# Patient Record
Sex: FEMALE | Race: BLACK OR AFRICAN AMERICAN | NOT HISPANIC OR LATINO | URBAN - METROPOLITAN AREA
[De-identification: names, ages, dates, MRNs, and addresses within clinical notes are randomized per-mention and may not be internally consistent; named-entity substitution may affect disease eponyms.]

---

## 2019-11-26 LAB
ABO + RH BLD: NORMAL
C TRACH RRNA SPEC QL PROBE: NEGATIVE
HBV SURFACE AG SERPL QL IA: NEGATIVE
HIV 1+2 AB+HIV1 P24 AG SERPL QL IA: NEGATIVE
INDIRECT COOMBS: NEGATIVE
N GONORRHOEAE, AMPLIFIED DNA: NEGATIVE
RPR: NON REACTIVE
RUBELLA IMMUNE STATUS: NORMAL

## 2020-04-03 LAB
GLUCOSE, 1 HOUR: 195
GLUCOSE, 3 HOUR: 168
GLUCOSE, FASTING: 76
HCT VFR BLD AUTO: 31 % (ref 36–46)
HGB BLD-MCNC: 9.9 G/DL (ref 12–16)
INDIRECT COOMBS: NEGATIVE

## 2020-05-29 LAB — GLUCOSE, 2 HOUR: 178

## 2020-06-22 ENCOUNTER — HOSPITAL ENCOUNTER (OUTPATIENT)
Facility: HOSPITAL | Age: 40
Discharge: HOME | End: 2020-06-22
Attending: OBSTETRICS & GYNECOLOGY | Admitting: OBSTETRICS & GYNECOLOGY
Payer: COMMERCIAL

## 2020-06-22 VITALS — SYSTOLIC BLOOD PRESSURE: 126 MMHG | HEART RATE: 90 BPM | DIASTOLIC BLOOD PRESSURE: 79 MMHG

## 2020-06-22 PROCEDURE — 59025 FETAL NON-STRESS TEST: CPT

## 2020-06-25 ENCOUNTER — HOSPITAL ENCOUNTER (OUTPATIENT)
Facility: HOSPITAL | Age: 40
Discharge: HOME | End: 2020-06-25
Attending: OBSTETRICS & GYNECOLOGY | Admitting: OBSTETRICS & GYNECOLOGY
Payer: COMMERCIAL

## 2020-06-25 VITALS — SYSTOLIC BLOOD PRESSURE: 123 MMHG | HEART RATE: 78 BPM | DIASTOLIC BLOOD PRESSURE: 75 MMHG

## 2020-06-25 DIAGNOSIS — O24.419 GESTATIONAL DIABETES MELLITUS (GDM): ICD-10-CM

## 2020-06-25 PROCEDURE — 59025 FETAL NON-STRESS TEST: CPT

## 2020-06-29 ENCOUNTER — HOSPITAL ENCOUNTER (OUTPATIENT)
Facility: HOSPITAL | Age: 40
Discharge: HOME | End: 2020-06-29
Attending: OBSTETRICS & GYNECOLOGY | Admitting: OBSTETRICS & GYNECOLOGY
Payer: COMMERCIAL

## 2020-06-29 VITALS — DIASTOLIC BLOOD PRESSURE: 75 MMHG | SYSTOLIC BLOOD PRESSURE: 128 MMHG | HEART RATE: 74 BPM

## 2020-06-29 DIAGNOSIS — O24.419 GDM (GESTATIONAL DIABETES MELLITUS): ICD-10-CM

## 2020-06-29 PROCEDURE — 59025 FETAL NON-STRESS TEST: CPT

## 2020-06-29 NOTE — DISCHARGE INSTRUCTIONS
Keep your scheduled appointment with your provider.    Call your Doctor if you have any of the following:  Temperature above 100 degrees  Nausea, vomiting and/or diarrhea  Severe headache, dizziness, or blurred vision  Notable increase in swelling of hands or feet  Notable swelling of face and lips  Difficulty, pain or burning with urination  Foul smelling vaginal discharge  Decreased fetal movement    Come to the hospital if you have any of the following symptoms:  Your water breaks  Contractions every 3-5 minutes for 2 or more hours  Vaginal bleeding like a period    After 28 weeks, you should feel 10 distinct fetal movements within a 2 hour period.    It is recommended that you drink 1/2 a gallon of water each day.  Tea, Soda and Juice are  in addition to this.

## 2020-06-30 LAB
PRENATAL STREP B CULTURE: POSITIVE

## 2020-07-02 ENCOUNTER — HOSPITAL ENCOUNTER (OUTPATIENT)
Facility: HOSPITAL | Age: 40
LOS: 1 days | Discharge: HOME | End: 2020-07-02
Attending: OBSTETRICS & GYNECOLOGY | Admitting: OBSTETRICS & GYNECOLOGY
Payer: COMMERCIAL

## 2020-07-02 VITALS — HEART RATE: 74 BPM | DIASTOLIC BLOOD PRESSURE: 76 MMHG | SYSTOLIC BLOOD PRESSURE: 129 MMHG

## 2020-07-02 DIAGNOSIS — O24.419 GDM (GESTATIONAL DIABETES MELLITUS): ICD-10-CM

## 2020-07-02 PROCEDURE — 59025 FETAL NON-STRESS TEST: CPT

## 2020-07-06 ENCOUNTER — HOSPITAL ENCOUNTER (OUTPATIENT)
Facility: HOSPITAL | Age: 40
Discharge: HOME | End: 2020-07-06
Attending: OBSTETRICS & GYNECOLOGY | Admitting: OBSTETRICS & GYNECOLOGY
Payer: COMMERCIAL

## 2020-07-06 VITALS — SYSTOLIC BLOOD PRESSURE: 130 MMHG | HEART RATE: 78 BPM | DIASTOLIC BLOOD PRESSURE: 89 MMHG

## 2020-07-06 DIAGNOSIS — O24.419 GESTATIONAL DIABETES MELLITUS (GDM): ICD-10-CM

## 2020-07-06 PROCEDURE — 59025 FETAL NON-STRESS TEST: CPT

## 2020-07-09 ENCOUNTER — HOSPITAL ENCOUNTER (OUTPATIENT)
Facility: HOSPITAL | Age: 40
Discharge: HOME | End: 2020-07-09
Attending: OBSTETRICS & GYNECOLOGY | Admitting: OBSTETRICS & GYNECOLOGY
Payer: COMMERCIAL

## 2020-07-09 DIAGNOSIS — O24.419 GESTATIONAL DIABETES: ICD-10-CM

## 2020-07-09 DIAGNOSIS — Z01.818 OTHER SPECIFIED PRE-OPERATIVE EXAMINATION: Primary | ICD-10-CM

## 2020-07-09 PROCEDURE — 59025 FETAL NON-STRESS TEST: CPT

## 2020-07-13 ENCOUNTER — ANESTHESIA (OUTPATIENT)
Dept: OBSTETRICS AND GYNECOLOGY | Facility: HOSPITAL | Age: 40
End: 2020-07-13
Payer: COMMERCIAL

## 2020-07-13 ENCOUNTER — ANESTHESIA EVENT (OUTPATIENT)
Dept: OBSTETRICS AND GYNECOLOGY | Facility: HOSPITAL | Age: 40
End: 2020-07-13
Payer: COMMERCIAL

## 2020-07-13 ENCOUNTER — HOSPITAL ENCOUNTER (INPATIENT)
Facility: HOSPITAL | Age: 40
LOS: 2 days | Discharge: HOME | End: 2020-07-15
Attending: OBSTETRICS & GYNECOLOGY | Admitting: OBSTETRICS & GYNECOLOGY
Payer: COMMERCIAL

## 2020-07-13 DIAGNOSIS — Z98.891 PREVIOUS CESAREAN SECTION: Primary | ICD-10-CM

## 2020-07-13 LAB
ABO + RH BLD: NORMAL
AMPHET+METHAMPHET UR QL: NEGATIVE
ANION GAP SERPL CALC-SCNC: 12 MMOL/L (ref 8–16)
BARBITURATES UR QL SCN>200 NG/ML: NEGATIVE
BASOPHILS # BLD AUTO: 0.03 K/UL (ref 0–0.2)
BASOPHILS NFR BLD: 0.3 % (ref 0–1.9)
BENZODIAZ UR QL SCN>200 NG/ML: NEGATIVE
BLD GP AB SCN CELLS X3 SERPL QL: NORMAL
BLOOD GROUP ANTIBODIES SERPL: NORMAL
BUN SERPL-MCNC: 11 MG/DL (ref 6–20)
BZE UR QL SCN: NEGATIVE
CALCIUM SERPL-MCNC: 8.1 MG/DL (ref 8.7–10.5)
CANNABINOIDS UR QL SCN: NEGATIVE
CHLORIDE SERPL-SCNC: 105 MMOL/L (ref 95–110)
CO2 SERPL-SCNC: 19 MMOL/L (ref 23–29)
CREAT SERPL-MCNC: 0.6 MG/DL (ref 0.5–1.4)
CREAT UR-MCNC: 64 MG/DL (ref 15–325)
DIFFERENTIAL METHOD: ABNORMAL
EOSINOPHIL # BLD AUTO: 0.1 K/UL (ref 0–0.5)
EOSINOPHIL NFR BLD: 0.6 % (ref 0–8)
ERYTHROCYTE [DISTWIDTH] IN BLOOD BY AUTOMATED COUNT: 13.2 % (ref 11.5–14.5)
EST. GFR  (AFRICAN AMERICAN): >60 ML/MIN/1.73 M^2
EST. GFR  (NON AFRICAN AMERICAN): >60 ML/MIN/1.73 M^2
GLUCOSE SERPL-MCNC: 77 MG/DL (ref 70–110)
HCT VFR BLD AUTO: 30 % (ref 37–48.5)
HGB BLD-MCNC: 9.3 G/DL (ref 12–16)
IMM GRANULOCYTES # BLD AUTO: 0.05 K/UL (ref 0–0.04)
IMM GRANULOCYTES NFR BLD AUTO: 0.6 % (ref 0–0.5)
LYMPHOCYTES # BLD AUTO: 1.9 K/UL (ref 1–4.8)
LYMPHOCYTES NFR BLD: 22.1 % (ref 18–48)
MCH RBC QN AUTO: 24.8 PG (ref 27–31)
MCHC RBC AUTO-ENTMCNC: 31 G/DL (ref 32–36)
MCV RBC AUTO: 80 FL (ref 82–98)
MONOCYTES # BLD AUTO: 0.7 K/UL (ref 0.3–1)
MONOCYTES NFR BLD: 7.8 % (ref 4–15)
NEUTROPHILS # BLD AUTO: 5.9 K/UL (ref 1.8–7.7)
NEUTROPHILS NFR BLD: 68.6 % (ref 38–73)
NRBC BLD-RTO: 0 /100 WBC
OPIATES UR QL SCN: NEGATIVE
PCP UR QL SCN>25 NG/ML: NEGATIVE
PCP UR QL SCN>25 NG/ML: NEGATIVE
PLATELET # BLD AUTO: 240 K/UL (ref 150–350)
PMV BLD AUTO: 10.1 FL (ref 9.2–12.9)
POTASSIUM SERPL-SCNC: 3.5 MMOL/L (ref 3.5–5.1)
RBC # BLD AUTO: 3.75 M/UL (ref 4–5.4)
RPR SER QL: NORMAL
SARS-COV-2 RDRP RESP QL NAA+PROBE: NEGATIVE
SODIUM SERPL-SCNC: 136 MMOL/L (ref 136–145)
TOXICOLOGY INFORMATION: NORMAL
WBC # BLD AUTO: 8.63 K/UL (ref 3.9–12.7)

## 2020-07-13 PROCEDURE — 63600175 PHARM REV CODE 636 W HCPCS: Performed by: NURSE ANESTHETIST, CERTIFIED REGISTERED

## 2020-07-13 PROCEDURE — 63600175 PHARM REV CODE 636 W HCPCS: Performed by: OBSTETRICS & GYNECOLOGY

## 2020-07-13 PROCEDURE — C9290 INJ, BUPIVACAINE LIPOSOME: HCPCS | Performed by: OBSTETRICS & GYNECOLOGY

## 2020-07-13 PROCEDURE — 25000003 PHARM REV CODE 250: Performed by: ANESTHESIOLOGY

## 2020-07-13 PROCEDURE — 27000670 HC SET COMBINED SPINAL AND EPIDURAL: Performed by: ANESTHESIOLOGY

## 2020-07-13 PROCEDURE — S0020 INJECTION, BUPIVICAINE HYDRO: HCPCS | Performed by: OBSTETRICS & GYNECOLOGY

## 2020-07-13 PROCEDURE — 80048 BASIC METABOLIC PNL TOTAL CA: CPT

## 2020-07-13 PROCEDURE — 62326 NJX INTERLAMINAR LMBR/SAC: CPT | Performed by: ANESTHESIOLOGY

## 2020-07-13 PROCEDURE — 25000003 PHARM REV CODE 250: Performed by: OBSTETRICS & GYNECOLOGY

## 2020-07-13 PROCEDURE — 71000033 HC RECOVERY, INTIAL HOUR: Performed by: OBSTETRICS & GYNECOLOGY

## 2020-07-13 PROCEDURE — 80307 DRUG TEST PRSMV CHEM ANLYZR: CPT

## 2020-07-13 PROCEDURE — C1751 CATH, INF, PER/CENT/MIDLINE: HCPCS | Performed by: ANESTHESIOLOGY

## 2020-07-13 PROCEDURE — 85025 COMPLETE CBC W/AUTO DIFF WBC: CPT

## 2020-07-13 PROCEDURE — 37000009 HC ANESTHESIA EA ADD 15 MINS: Performed by: OBSTETRICS & GYNECOLOGY

## 2020-07-13 PROCEDURE — 86870 RBC ANTIBODY IDENTIFICATION: CPT

## 2020-07-13 PROCEDURE — 37000008 HC ANESTHESIA 1ST 15 MINUTES: Performed by: OBSTETRICS & GYNECOLOGY

## 2020-07-13 PROCEDURE — U0002 COVID-19 LAB TEST NON-CDC: HCPCS

## 2020-07-13 PROCEDURE — 86592 SYPHILIS TEST NON-TREP QUAL: CPT

## 2020-07-13 PROCEDURE — 27000284 HC CANNULA NASAL: Performed by: ANESTHESIOLOGY

## 2020-07-13 PROCEDURE — 86850 RBC ANTIBODY SCREEN: CPT

## 2020-07-13 PROCEDURE — 36415 COLL VENOUS BLD VENIPUNCTURE: CPT

## 2020-07-13 PROCEDURE — 12000002 HC ACUTE/MED SURGE SEMI-PRIVATE ROOM

## 2020-07-13 PROCEDURE — 36000680 HC C/S TUBAL LIGATION LEVEL I

## 2020-07-13 PROCEDURE — 71000039 HC RECOVERY, EACH ADD'L HOUR: Performed by: OBSTETRICS & GYNECOLOGY

## 2020-07-13 RX ORDER — PRAMOXINE HYDROCHLORIDE 10 MG/G
AEROSOL, FOAM TOPICAL 4 TIMES DAILY PRN
Status: DISCONTINUED | OUTPATIENT
Start: 2020-07-13 | End: 2020-07-15 | Stop reason: HOSPADM

## 2020-07-13 RX ORDER — ONDANSETRON 2 MG/ML
4 INJECTION INTRAMUSCULAR; INTRAVENOUS EVERY 6 HOURS PRN
Status: ACTIVE | OUTPATIENT
Start: 2020-07-13 | End: 2020-07-15

## 2020-07-13 RX ORDER — SODIUM CHLORIDE, SODIUM LACTATE, POTASSIUM CHLORIDE, CALCIUM CHLORIDE 600; 310; 30; 20 MG/100ML; MG/100ML; MG/100ML; MG/100ML
INJECTION, SOLUTION INTRAVENOUS CONTINUOUS PRN
Status: DISCONTINUED | OUTPATIENT
Start: 2020-07-13 | End: 2020-07-13

## 2020-07-13 RX ORDER — PHENYLEPHRINE HYDROCHLORIDE 10 MG/ML
INJECTION INTRAVENOUS
Status: DISCONTINUED | OUTPATIENT
Start: 2020-07-13 | End: 2020-07-13

## 2020-07-13 RX ORDER — DOCUSATE SODIUM 100 MG/1
200 CAPSULE, LIQUID FILLED ORAL 2 TIMES DAILY
Status: DISCONTINUED | OUTPATIENT
Start: 2020-07-13 | End: 2020-07-15 | Stop reason: HOSPADM

## 2020-07-13 RX ORDER — DIPHENHYDRAMINE HCL 25 MG
25 CAPSULE ORAL EVERY 4 HOURS PRN
Status: DISCONTINUED | OUTPATIENT
Start: 2020-07-13 | End: 2020-07-15 | Stop reason: HOSPADM

## 2020-07-13 RX ORDER — ADHESIVE BANDAGE
30 BANDAGE TOPICAL 2 TIMES DAILY PRN
Status: DISCONTINUED | OUTPATIENT
Start: 2020-07-14 | End: 2020-07-15 | Stop reason: HOSPADM

## 2020-07-13 RX ORDER — HYDROCORTISONE 25 MG/G
CREAM TOPICAL 3 TIMES DAILY PRN
Status: DISCONTINUED | OUTPATIENT
Start: 2020-07-13 | End: 2020-07-15 | Stop reason: HOSPADM

## 2020-07-13 RX ORDER — FENTANYL CITRATE 50 UG/ML
INJECTION, SOLUTION INTRAMUSCULAR; INTRAVENOUS
Status: DISCONTINUED | OUTPATIENT
Start: 2020-07-13 | End: 2020-07-13

## 2020-07-13 RX ORDER — HYDROMORPHONE HYDROCHLORIDE 1 MG/ML
0.25 INJECTION, SOLUTION INTRAMUSCULAR; INTRAVENOUS; SUBCUTANEOUS
Status: DISCONTINUED | OUTPATIENT
Start: 2020-07-13 | End: 2020-07-14

## 2020-07-13 RX ORDER — BUPIVACAINE HYDROCHLORIDE 5 MG/ML
24 INJECTION, SOLUTION EPIDURAL; INTRACAUDAL
Status: DISCONTINUED | OUTPATIENT
Start: 2020-07-13 | End: 2020-07-15 | Stop reason: HOSPADM

## 2020-07-13 RX ORDER — PRENATAL WITH FERROUS FUM AND FOLIC ACID 3080; 920; 120; 400; 22; 1.84; 3; 20; 10; 1; 12; 200; 27; 25; 2 [IU]/1; [IU]/1; MG/1; [IU]/1; MG/1; MG/1; MG/1; MG/1; MG/1; MG/1; UG/1; MG/1; MG/1; MG/1; MG/1
1 TABLET ORAL DAILY
Status: DISCONTINUED | OUTPATIENT
Start: 2020-07-13 | End: 2020-07-15 | Stop reason: HOSPADM

## 2020-07-13 RX ORDER — CALCIUM CARBONATE 500(1250)
1 TABLET,CHEWABLE ORAL DAILY
COMMUNITY

## 2020-07-13 RX ORDER — DIPHENHYDRAMINE HYDROCHLORIDE 50 MG/ML
12.5 INJECTION INTRAMUSCULAR; INTRAVENOUS EVERY 4 HOURS PRN
Status: DISCONTINUED | OUTPATIENT
Start: 2020-07-13 | End: 2020-07-15 | Stop reason: HOSPADM

## 2020-07-13 RX ORDER — BISACODYL 10 MG
10 SUPPOSITORY, RECTAL RECTAL ONCE AS NEEDED
Status: DISCONTINUED | OUTPATIENT
Start: 2020-07-13 | End: 2020-07-15 | Stop reason: HOSPADM

## 2020-07-13 RX ORDER — OXYCODONE HYDROCHLORIDE 5 MG/1
10 TABLET ORAL EVERY 4 HOURS PRN
Status: DISCONTINUED | OUTPATIENT
Start: 2020-07-13 | End: 2020-07-15 | Stop reason: HOSPADM

## 2020-07-13 RX ORDER — ONDANSETRON 2 MG/ML
4 INJECTION INTRAMUSCULAR; INTRAVENOUS EVERY 6 HOURS PRN
Status: DISCONTINUED | OUTPATIENT
Start: 2020-07-13 | End: 2020-07-15 | Stop reason: HOSPADM

## 2020-07-13 RX ORDER — ONDANSETRON 2 MG/ML
INJECTION INTRAMUSCULAR; INTRAVENOUS
Status: DISCONTINUED | OUTPATIENT
Start: 2020-07-13 | End: 2020-07-13

## 2020-07-13 RX ORDER — OXYTOCIN-SODIUM CHLORIDE 0.9% IV SOLN 30 UNIT/500ML 30-0.9/5 UT/ML-%
SOLUTION INTRAVENOUS CONTINUOUS PRN
Status: DISCONTINUED | OUTPATIENT
Start: 2020-07-13 | End: 2020-07-13

## 2020-07-13 RX ORDER — DIPHENHYDRAMINE HYDROCHLORIDE 50 MG/ML
25 INJECTION INTRAMUSCULAR; INTRAVENOUS EVERY 4 HOURS PRN
Status: DISCONTINUED | OUTPATIENT
Start: 2020-07-13 | End: 2020-07-15 | Stop reason: HOSPADM

## 2020-07-13 RX ORDER — SIMETHICONE 80 MG
1 TABLET,CHEWABLE ORAL EVERY 6 HOURS PRN
Status: DISCONTINUED | OUTPATIENT
Start: 2020-07-13 | End: 2020-07-15 | Stop reason: HOSPADM

## 2020-07-13 RX ORDER — SODIUM CHLORIDE, SODIUM LACTATE, POTASSIUM CHLORIDE, CALCIUM CHLORIDE 600; 310; 30; 20 MG/100ML; MG/100ML; MG/100ML; MG/100ML
INJECTION, SOLUTION INTRAVENOUS CONTINUOUS
Status: DISCONTINUED | OUTPATIENT
Start: 2020-07-13 | End: 2020-07-15 | Stop reason: HOSPADM

## 2020-07-13 RX ORDER — NALOXONE HCL 0.4 MG/ML
0.4 VIAL (ML) INJECTION ONCE
Status: DISCONTINUED | OUTPATIENT
Start: 2020-07-13 | End: 2020-07-15 | Stop reason: HOSPADM

## 2020-07-13 RX ORDER — NALBUPHINE HYDROCHLORIDE 20 MG/ML
5 INJECTION, SOLUTION INTRAMUSCULAR; INTRAVENOUS; SUBCUTANEOUS
Status: DISCONTINUED | OUTPATIENT
Start: 2020-07-13 | End: 2020-07-15 | Stop reason: HOSPADM

## 2020-07-13 RX ORDER — HYDROMORPHONE HCL IN 0.9% NACL 6 MG/30 ML
PATIENT CONTROLLED ANALGESIA SYRINGE INTRAVENOUS CONTINUOUS
Status: DISCONTINUED | OUTPATIENT
Start: 2020-07-13 | End: 2020-07-13

## 2020-07-13 RX ORDER — CEFAZOLIN SODIUM 2 G/50ML
2 SOLUTION INTRAVENOUS
Status: COMPLETED | OUTPATIENT
Start: 2020-07-13 | End: 2020-07-13

## 2020-07-13 RX ADMIN — SODIUM CHLORIDE, SODIUM LACTATE, POTASSIUM CHLORIDE, AND CALCIUM CHLORIDE: .6; .31; .03; .02 INJECTION, SOLUTION INTRAVENOUS at 01:07

## 2020-07-13 RX ADMIN — Medication: at 11:07

## 2020-07-13 RX ADMIN — PHENYLEPHRINE HYDROCHLORIDE 100 MCG: 10 INJECTION INTRAVENOUS at 07:07

## 2020-07-13 RX ADMIN — Medication: at 08:07

## 2020-07-13 RX ADMIN — CEFAZOLIN SODIUM 2 G: 2 SOLUTION INTRAVENOUS at 07:07

## 2020-07-13 RX ADMIN — SODIUM CHLORIDE, SODIUM LACTATE, POTASSIUM CHLORIDE, AND CALCIUM CHLORIDE: .6; .31; .03; .02 INJECTION, SOLUTION INTRAVENOUS at 07:07

## 2020-07-13 RX ADMIN — FENTANYL CITRATE 10 MCG: 50 INJECTION INTRAMUSCULAR; INTRAVENOUS at 07:07

## 2020-07-13 RX ADMIN — SODIUM CHLORIDE, SODIUM LACTATE, POTASSIUM CHLORIDE, AND CALCIUM CHLORIDE: .6; .31; .03; .02 INJECTION, SOLUTION INTRAVENOUS at 10:07

## 2020-07-13 RX ADMIN — SODIUM CHLORIDE, SODIUM LACTATE, POTASSIUM CHLORIDE, AND CALCIUM CHLORIDE 1000 ML: .6; .31; .03; .02 INJECTION, SOLUTION INTRAVENOUS at 07:07

## 2020-07-13 RX ADMIN — Medication 2250 ML/HR: at 07:07

## 2020-07-13 RX ADMIN — ONDANSETRON 4 MG: 2 INJECTION INTRAMUSCULAR; INTRAVENOUS at 07:07

## 2020-07-13 RX ADMIN — FENTANYL CITRATE 90 MCG: 50 INJECTION INTRAMUSCULAR; INTRAVENOUS at 07:07

## 2020-07-13 NOTE — ANESTHESIA PREPROCEDURE EVALUATION
2020  Flaca Dejesus is a 40 y.o., female.    Patient Active Problem List   Diagnosis    Gestational diabetes mellitus (GDM)    GDM (gestational diabetes mellitus)    Gestational diabetes    Previous  section       Past Surgical History:   Procedure Laterality Date     SECTION      TONSILLECTOMY AND ADENOIDECTOMY          Tobacco Use:  The patient  reports that she has never smoked. She has never used smokeless tobacco.     No results found for this or any previous visit.          Lab Results   Component Value Date    WBC 8.63 2020    HGB 9.3 (L) 2020    HCT 30.0 (L) 2020    MCV 80 (L) 2020     2020     BMP  Lab Results   Component Value Date     2020    K 3.5 2020     2020    CO2 19 (L) 2020    BUN 11 2020    CREATININE 0.6 2020    CALCIUM 8.1 (L) 2020    ANIONGAP 12 2020    GLU 77 2020       No results found for this or any previous visit.        Anesthesia Evaluation          Review of Systems  Anesthesia Hx:  No problems with previous Anesthesia   Hepatic/GI:   GERD (TUMS prn)    Endocrine:  Diabetes, Gestational Diabetes        Physical Exam  General:  Well nourished    Airway/Jaw/Neck:  Airway Findings: Mouth Opening: Normal Tongue: Normal  General Airway Assessment: Adult  Mallampati: II  TM Distance: Normal, at least 6 cm  Jaw/Neck Findings:  Neck ROM: Normal ROM     Eyes/Ears/Nose:  Eyes/Ears/Nose Findings:    Dental:  Dental Findings: In tact   Chest/Lungs:  Chest/Lungs Findings: Clear to auscultation, Normal Respiratory Rate     Heart/Vascular:  Heart Findings: Rate: Normal  Rhythm: Regular Rhythm  Sounds: Normal     Abdomen:  Abdomen Findings:     Musculoskeletal:  Musculoskeletal Findings:    Skin:  Skin Findings:     Mental Status:  Mental Status Findings:   Cooperative, Alert and Oriented         Anesthesia Plan  Type of Anesthesia, risks & benefits discussed:  Anesthesia Type:  CSE  Patient's Preference: CSE  Intra-op Monitoring Plan: standard ASA monitors  Intra-op Monitoring Plan Comments:   Post Op Pain Control Plan: multimodal analgesia  Post Op Pain Control Plan Comments:   Induction:    Beta Blocker:         Informed Consent: Patient understands risks and agrees with Anesthesia plan.  Questions answered. Anesthesia consent signed with patient.  ASA Score: 2     Day of Surgery Review of History & Physical:    H&P update referred to the surgeon.     Anesthesia Plan Notes: CSE.  Ofirmev 1000mg.  Dilaudid PCA for post op pain.  No Duramorph.          Ready For Surgery From Anesthesia Perspective.

## 2020-07-13 NOTE — L&D DELIVERY NOTE
[facility]   Section   Operative Note    SUMMARY     Date of Procedure: 2020     Procedure: Procedure(s) (LRB):   SECTION, WITH TUBAL LIGATION (N/A)    Surgeon(s) and Role:     * Sita Pierce MD - Primary    Assisting Surgeon: None    Pre-Operative Diagnosis:  IUP at 39 wga, previous c/section x 2, undesired fertility, GDM, AMA    Post-Operative Diagnosis: Post-Op Diagnosis Codes: same     * Sterilization [Z30.2]     * Maternal care due to low transverse uterine scar from previous  delivery [O34.211]    Anesthesia: spinal    Technical Procedures Used: low transverse c/section,  Tubal ligation via parkland method           Description of the Findings of the Procedure: VMI, apgars 9/9, vertex, 7#4, normal uterus, fallopian tubes, and ovaries      Complications: No    Blood Loss: 500 mL     Procedure in detail - The risks benefits indications and alternatives of procedure were discussed with the patient and informed consent was obtained. The patient signed her tubal papers in the office and she understands that a tubal ligation is for permanent birth control, but there is a risk of failure of approximately 5 in one thousand with increased risk of ectopic if pregnancy does occur. She was taken to the operating room where spinal anesthesia was found to be adequate. She was prepped and draped in normal sterile fashion in the supine position with a Akbar catheter in place. A Pfannenstiel skin incision was made with a scalpel and carried through to the underlying layer fascia with the Bovie. The fascia was incised in the midline and this incision was extended laterally using the curved Portillo scissors. The superior aspect of the fascial incision was grasped with Мария clamps and underlying rectus muscles were dissected off sharply using the curved Portillo scissors and bluntly. The inferior aspect of the fascial incision was treated in a similar manner. The rectus muscles were   in the midline and the peritoneum was identified and entered bluntly. The peritoneal incision was extended superiorly and inferiorly with good visualization the bladder. The bladder blade was inserted and the vesicouterine peritoneum was incised in the midline. This incision was extended laterally and the bladder flap was created digitally. The lower uterine segment was incised in the midline and this incision was extended laterally digitally. The membranes were ruptured upon entry. The bladder blade was removed and the infant's head shoulders and body delivered without difficulty. The cord was clamped and cut and the infant was handed off to waiting nursery staff. The placenta was delivered and the uterus was exteriorized and cleared of all clots and debris. The uterine incision was closed with 0 PDS in a running locked fashion in one layer and excellent hemostasis was seen. Each fallopian tube was then grasped with a Denver clamp and a 2-0 silk tie was placed at the proximal portion of each tube. A 3 cm segment in the middle of each tube was suture ligated using 2-0 plain gut and excised using the Metzenbaum scissors. Excellent hemostasis was seen. The uterus was returned the pelvis and the pelvis was irrigated copiously with warm normal saline. There was a small vein bleeding on the very top of the vesicouterine peritoneum close to the bladder and a 3-O chromic figure of 8 suture was placed.  The uterine incision, lower uterine segment, and fallopian tubes were all checked for hemostasis and all instruments and sponges were removed from the abdomen and pelvis. The peritoneum was closed using 2-0 Vicryl in a running fashion. The rectus muscles were checked for hemostasis. The fascia was closed using 0 PDS in a running fashion. The subcuticular fat was irrigated with normal saline and hemostasis was achieved using the Bovie. A dilute solution of exparel with marcaine was injected sub q per unit protocol.   Deidre's fascia was reapproximated using 2-0 Vicryl in a running fashion. The skin was closed using 4-0 Vicryl in a subcuticular fashion and a Mepilex dressing was placed over the incision. The patient tolerated the procedure well. Sponge lap needle and instrument counts were correct ×2. She was taken recovery in stable condition.       Specimens:   Specimen (12h ago, onward)     Start     Ordered    20  Specimen to Pathology - Surgery  Once     Comments: Pre-op Diagnosis: IUP@39.1, undesired fertility, repeat c/s with BTLProcedure(s): SECTION, WITH TUBAL LIGATION Number of specimens: 2Name of specimens: left and right fallopian tubes      2039                Condition: Good    Disposition: L&D recovery    Attestation: Good         Delivery Information for Román Dejesus    Birth information:  YOB: 2018   Time of birth: 7:45 AM   Sex: male   Head Delivery Date/Time: 2020  7:45 AM   Delivery type: , Low Transverse   Gestational Age: 39w1d    Delivery Providers    Delivering clinician: Sita Pierce MD           Measurements    Weight: 3303 g  Length: 50.2 cm         Apgars    Living status: Living  Apgars:  1 min.:  5 min.:  10 min.:  15 min.:  20 min.:    Skin color:  1  1       Heart rate:  2  2       Reflex irritability:  2  2       Muscle tone:  2  2       Respiratory effort:  2  2       Total:  9  9       Apgars assigned by: SANTA BERGMAN RN         Operative Delivery    Forceps attempted?: No  Vacuum extractor attempted?: No         Shoulder Dystocia    Shoulder dystocia present?: No           Presentation    Presentation: Vertex  Position: Left Occiput Anterior           Interventions/Resuscitation    Method: Tactile Stimulation, Bulb Suctioning       Cord    Vessels: 3 vessels  Complications: Nuchal  Nuchal Intervention: reduced  Nuchal Cord Description: loose nuchal cord  Number of Loops: 1  Delayed Cord Clamping?: Yes  Cord Clamped Date/Time:  2020  7:46 AM  Cord Blood Disposition: Sent with Baby  Gases Sent?: No  Stem Cell Collection (by MD): No       Placenta    Placenta delivery date/time: 2020 0746  Placenta removal: Manual removal  Placenta appearance: Intact  Placenta disposition: discarded           Labor Events:       labor: No     Labor Onset Date/Time:         Dilation Complete Date/Time:         Start Pushing Date/Time:         Start Pushing Date/Time:       Rupture Date/Time:            Rupture type:          Fluid Amount:       Fluid Color:       Fluid Odor:       Membrane Status:                steroids: None     Antibiotics given for GBS: No     Induction:       Indications for induction:        Augmentation:       Indications for augmentation:       Labor complications:       Additional complications:          Cervical ripening:                     Delivery:      Episiotomy:       Indication for Episiotomy:       Perineal Lacerations:   Repaired:      Periurethral Laceration:   Repaired:     Labial Laceration:   Repaired:     Sulcus Laceration:   Repaired:     Vaginal Laceration:   Repaired:     Cervical Laceration:   Repaired:     Repair suture:       Repair # of packets:       Last Value - EBL - Nursing (mL):       Sum - EBL - Nursing (mL): 500     Last Value - EBL - Anesthesia (mL): 500      Calculated QBL (mL):       Vaginal Sweep Performed:       Surgicount Correct:         Other providers:       Anesthesia    Method: Spinal, Epidural          Details (if applicable):  Trial of Labor No    Categorization: Repeat    Priority: Routine   Indications for : Repeat Section   Incision Type: low transverse     Additional  information:  Forceps:    Vacuum:    Breech:    Observed anomalies    Other (Comments):

## 2020-07-13 NOTE — ANESTHESIA POSTPROCEDURE EVALUATION
Anesthesia Post Evaluation    Patient: Flaca Dejesus    Procedure(s) Performed: Procedure(s) (LRB):   SECTION, WITH TUBAL LIGATION (N/A)    Final Anesthesia Type: CSE    Patient location during evaluation: labor & delivery  Patient participation: Yes- Able to Participate  Level of consciousness: awake and alert  Post-procedure vital signs: reviewed and stable  Pain management: adequate  Airway patency: patent    PONV status at discharge: No PONV  Anesthetic complications: no      Cardiovascular status: blood pressure returned to baseline and stable  Respiratory status: unassisted and room air  Hydration status: euvolemic  Follow-up needed           Vitals Value Taken Time   /88 20 0906   Temp 36.6 °C (97.9 °F) 20 0516   Pulse 61 20 0916   Resp 16 20 0901   SpO2 100 % 20 0916   Vitals shown include unvalidated device data.      No case tracking events are documented in the log.      Pain/Oly Score: No data recorded

## 2020-07-13 NOTE — NURSING
Pt transferred to 2103 via bed with all belongings. Pt denies any pain. Bleeding light, fundus firm. NADN. Bedside handoff complete with Zita Renner RN

## 2020-07-13 NOTE — TRANSFER OF CARE
"Anesthesia Transfer of Care Note    Patient: Flaca Dejesus    Procedure(s) Performed: Procedure(s) (LRB):   SECTION, WITH TUBAL LIGATION (N/A)    Patient location: Labor and Delivery    Anesthesia Type: CSE    Transport from OR: Transported from OR on room air with adequate spontaneous ventilation    Post pain: adequate analgesia    Post assessment: no apparent anesthetic complications    Post vital signs: stable    Level of consciousness: awake, alert and oriented    Nausea/Vomiting: no nausea/vomiting    Complications: none    Transfer of care protocol was followed      Last vitals:   Visit Vitals  /80   Pulse 76   Temp 36.6 °C (97.9 °F) (Oral)   Resp 18   Ht 5' 4" (1.626 m)   Wt 68.9 kg (152 lb)   Breastfeeding No   BMI 26.09 kg/m²     "

## 2020-07-13 NOTE — ANESTHESIA PROCEDURE NOTES
CSE    Patient location during procedure: OB  Start time: 7/13/2020 7:18 AM  Timeout: 7/13/2020 7:17 AM  End time: 7/13/2020 7:22 AM    Staffing  Authorizing Provider: Sher Obrien MD  Performing Provider: Sher Obrien MD    Preanesthetic Checklist  Completed: patient identified, surgical consent, pre-op evaluation, timeout performed, IV checked, risks and benefits discussed and monitors and equipment checked  CSE  Patient position: sitting  Prep: Betadine  Patient monitoring: heart rate, cardiac monitor, continuous pulse ox and frequent blood pressure checks  Approach: midline  Spinal Needle  Needle type: pencil-tip   Needle gauge: 25 G  Needle length: 5 in  Epidural Needle  Injection technique: MELI air  Needle type: Tuohy   Needle gauge: 17 G  Needle length: 3.5 in  Location: L3-4  Needle localization: anatomical landmarks  Catheter  Catheter type: springwTotalTakeout  Catheter size: 19 G  Test dose: lidocaine 1.5% with Epi 1-to-200,000  Additional Documentation: incremental injection, negative aspiration for CSF and negative aspiration for heme  Assessment  Sensory level: T5   Dermatomal levels determined by pinch or prick  Intrathecal Medications:  administered: primary anesthetic mcg of    Additional Notes  After CSF was obtained, a mixture of bupivacaine 0.75% hyperbaric x 1.4 mls with fentanyl 10 mcg was injected.

## 2020-07-14 ENCOUNTER — ANESTHESIA EVENT (OUTPATIENT)
Dept: OBSTETRICS AND GYNECOLOGY | Facility: HOSPITAL | Age: 40
End: 2020-07-14

## 2020-07-14 ENCOUNTER — ANESTHESIA (OUTPATIENT)
Dept: OBSTETRICS AND GYNECOLOGY | Facility: HOSPITAL | Age: 40
End: 2020-07-14

## 2020-07-14 LAB
ABO + RH BLD: NORMAL
BLD GP AB SCN CELLS X3 SERPL QL: NORMAL
ERYTHROCYTE [DISTWIDTH] IN BLOOD BY AUTOMATED COUNT: 13.2 % (ref 11.5–14.5)
FETAL CELL SCN BLD QL ROSETTE: NORMAL
HCT VFR BLD AUTO: 25 % (ref 37–48.5)
HGB BLD-MCNC: 7.6 G/DL (ref 12–16)
INJECT RH IG VOL PATIENT: NORMAL ML
MCH RBC QN AUTO: 24.6 PG (ref 27–31)
MCHC RBC AUTO-ENTMCNC: 30.4 G/DL (ref 32–36)
MCV RBC AUTO: 81 FL (ref 82–98)
PLATELET # BLD AUTO: 219 K/UL (ref 150–350)
PMV BLD AUTO: 10.2 FL (ref 9.2–12.9)
RBC # BLD AUTO: 3.09 M/UL (ref 4–5.4)
WBC # BLD AUTO: 10.89 K/UL (ref 3.9–12.7)

## 2020-07-14 PROCEDURE — 63600519 RHOGAM PHARM REV CODE 636 ALT 250 W HCPCS: Performed by: OBSTETRICS & GYNECOLOGY

## 2020-07-14 PROCEDURE — 86850 RBC ANTIBODY SCREEN: CPT

## 2020-07-14 PROCEDURE — 12000002 HC ACUTE/MED SURGE SEMI-PRIVATE ROOM

## 2020-07-14 PROCEDURE — 25000003 PHARM REV CODE 250: Performed by: OBSTETRICS & GYNECOLOGY

## 2020-07-14 PROCEDURE — 85461 HEMOGLOBIN FETAL: CPT

## 2020-07-14 PROCEDURE — 85027 COMPLETE CBC AUTOMATED: CPT

## 2020-07-14 RX ORDER — HYDROMORPHONE HYDROCHLORIDE 1 MG/ML
1.5 INJECTION, SOLUTION INTRAMUSCULAR; INTRAVENOUS; SUBCUTANEOUS EVERY 4 HOURS PRN
Status: DISCONTINUED | OUTPATIENT
Start: 2020-07-14 | End: 2020-07-15 | Stop reason: HOSPADM

## 2020-07-14 RX ADMIN — DOCUSATE SODIUM 200 MG: 100 CAPSULE, LIQUID FILLED ORAL at 09:07

## 2020-07-14 RX ADMIN — SIMETHICONE 80 MG: 80 TABLET, CHEWABLE ORAL at 08:07

## 2020-07-14 RX ADMIN — OXYCODONE HYDROCHLORIDE 10 MG: 5 TABLET ORAL at 04:07

## 2020-07-14 RX ADMIN — OXYCODONE HYDROCHLORIDE 10 MG: 5 TABLET ORAL at 06:07

## 2020-07-14 RX ADMIN — DOCUSATE SODIUM 200 MG: 100 CAPSULE, LIQUID FILLED ORAL at 08:07

## 2020-07-14 RX ADMIN — OXYCODONE HYDROCHLORIDE 5 MG: 5 TABLET ORAL at 02:07

## 2020-07-14 RX ADMIN — IBUPROFEN 600 MG: 400 TABLET, FILM COATED ORAL at 06:07

## 2020-07-14 RX ADMIN — OXYCODONE HYDROCHLORIDE 5 MG: 5 TABLET ORAL at 08:07

## 2020-07-14 RX ADMIN — HUMAN RHO(D) IMMUNE GLOBULIN 300 MCG: 1500 SOLUTION INTRAMUSCULAR; INTRAVENOUS at 04:07

## 2020-07-14 RX ADMIN — IBUPROFEN 600 MG: 400 TABLET, FILM COATED ORAL at 11:07

## 2020-07-14 RX ADMIN — PRENATAL VIT W/ FE FUMARATE-FA TAB 27-0.8 MG 1 TABLET: 27-0.8 TAB at 08:07

## 2020-07-14 NOTE — ANESTHESIA POST-OP PAIN MANAGEMENT
Acute Pain Service Progress Note    Flaca Dejesus is a 40 y.o., female, [id number].      Problem List:    Active Hospital Problems    Diagnosis  POA    *Previous  section [Z98.891]  Not Applicable      Resolved Hospital Problems   No resolved problems to display.         Vitals   Vitals:    20 0459   BP: 121/77   Pulse: 72   Resp: 18   Temp: 36.7 °C (98.1 °F)     Postop day 1.  Status post  under regional anesthesia.  Patient placed on PCA Dilaudid for postoperative pain control.  This morning patient is resting comfortably in bed she is alert and oriented without complaints.  She denies headache, back pain, leg pain weakness or numbness.  Epidural site examined in no evidence of bleeding bruising or discharge noted.    Patient reports overall good pain control since surgery with appropriate use of PCA.  PCA use has decreased overnight and this morning patient has had minimal use.  Will discontinue PCA this morning, we will continue oral pain medications as ordered and will add IV Dilaudid as needed for breakthrough pain.    No apparent anesthetic related complications.    Continue current pain management.    Please re-consult if needed.           Evaluator Frank Durán

## 2020-07-14 NOTE — PROGRESS NOTES
[facility]  Obstetrics  Postpartum Progress Note    Patient Name: Flaca Dejesus  MRN: [id number]  Admission Date: 2020  Hospital Length of Stay: 1 days  Attending Physician: Sita Pierce MD  Primary Care Provider: Primary Doctor No    Subjective:     Principal Problem:Previous  section     Hospital Course:  No notes on file    Interval History: s/p repeat c/section and BTL POD#1    She is doing well this morning. She is tolerating a regular diet without nausea or vomiting. She is voiding spontaneously. She is ambulating. She has not passed flatus, and has not a BM. Vaginal bleeding is mild. She denies fever or chills. Abdominal pain is mild and controlled with oral medications. She is breastfeeding. She desires circumcision for her male baby: yes.    Objective:     Vital Signs (Most Recent):  Temp: 98.2 °F (36.8 °C) (20)  Pulse: (!) 58 (20)  Resp: 18 (20)  BP: 132/83 (20)  SpO2: 97 % (20) Vital Signs (24h Range):  Temp:  [97.4 °F (36.3 °C)-98.5 °F (36.9 °C)] 98.2 °F (36.8 °C)  Pulse:  [56-72] 58  Resp:  [16-18] 18  SpO2:  [97 %-98 %] 97 %  BP: (114-146)/(74-89) 132/83     Weight: 68.9 kg (152 lb)  Body mass index is 26.09 kg/m².      Intake/Output Summary (Last 24 hours) at 2020 1325  Last data filed at 2020 1148  Gross per 24 hour   Intake 1433.4 ml   Output 4950 ml   Net -3516.6 ml           Significant Labs:  Lab Results   Component Value Date    GROUPTRH B NEG 2020    HEPBSAG Negative 2019    STREPBCULT negative 2020    STREPBCULT positive 2020    STREPBCULT positive 2020     Recent Labs   Lab 20  0341   HGB 7.6*   HCT 25.0*       I have personallly reviewed all pertinent lab results from the last 24 hours.    Physical Exam:   Constitutional: She is oriented to person, place, and time. She appears well-developed and well-nourished.    HENT:   Head: Normocephalic and atraumatic.        Pulmonary/Chest: Effort normal.        Abdominal: Soft. Bowel sounds are normal. She exhibits no distension. Abdominal incision: mepilex dressing in place, dry. There is no abdominal tenderness (appropriate post op tenderness).     Genitourinary:    Uterus normal.      Genitourinary Comments: Uterus if firm below the umbilicus             Musculoskeletal: Normal range of motion and moves all extremeties. No tenderness or edema.       Neurological: She is alert and oriented to person, place, and time.    Skin: Skin is warm and dry.    Psychiatric: She has a normal mood and affect. Her behavior is normal.       Assessment/Plan:     40 y.o. female  for:  S/p c/section, asymptomatic anemia    * Previous  section  S/p repeat c/section and BTL  POD#1, doing well    Continue post op care  Encouraged ambulations        .    Sita Pierce MD  Obstetrics  [facility]

## 2020-07-14 NOTE — PLAN OF CARE
JUNIOR met with patient and spouse to complete OB initial discharge planning assessment. Patient lives with spouse Matt Dejesus, 2 other children, and now baby. Patient has access to a car seat and plans to breast feed. Patient has access to diapers and understands process of how to apply for WIC. Patient stated they have no needs from /case management staff at this time. Pt did not have emergency contact or insurance on file so JUNIOR added pt's  at her request and copied insurance cards/sent to insurance verification dept.     07/14/20 1820   Discharge Assessment   Assessment Type Discharge Planning Assessment   Confirmed/corrected address and phone number on facesheet? Yes   Assessment information obtained from? Patient   Prior to hospitilization cognitive status: Alert/Oriented   Prior to hospitalization functional status: Independent   Current cognitive status: Alert/Oriented   Current Functional Status: Independent   Lives With spouse;child(kurt), dependent   Able to Return to Prior Arrangements yes   Is patient able to care for self after discharge? Yes   Who are your caregiver(s) and their phone number(s)? Spouse Matt Dejesus added as emergency contact ([phone])   Does the patient have transportation home? Yes   Transportation Anticipated family or friend will provide   Discharge Plan A Home   Discharge Plan B Home   DME Needed Upon Discharge  none   Patient/Family in Agreement with Plan yes

## 2020-07-14 NOTE — PLAN OF CARE
Patient has remained stable this shift. All vitals within limits. See flow sheet for assessments. Bleeding is controlled. Fundus is firm and midline, pain is managed by medications per mar. ID bands are on and verified. Patient has been educated this shift. All questions have been answered and denies needs at this time. \

## 2020-07-14 NOTE — SUBJECTIVE & OBJECTIVE
Interval History: s/p repeat c/section and BTL POD#1    She is doing well this morning. She is tolerating a regular diet without nausea or vomiting. She is voiding spontaneously. She is ambulating. She has not passed flatus, and has not a BM. Vaginal bleeding is mild. She denies fever or chills. Abdominal pain is mild and controlled with oral medications. She is breastfeeding. She desires circumcision for her male baby: yes.    Objective:     Vital Signs (Most Recent):  Temp: 98.2 °F (36.8 °C) (07/14/20 0814)  Pulse: (!) 58 (07/14/20 0814)  Resp: 18 (07/14/20 0843)  BP: 132/83 (07/14/20 0814)  SpO2: 97 % (07/14/20 0814) Vital Signs (24h Range):  Temp:  [97.4 °F (36.3 °C)-98.5 °F (36.9 °C)] 98.2 °F (36.8 °C)  Pulse:  [56-72] 58  Resp:  [16-18] 18  SpO2:  [97 %-98 %] 97 %  BP: (114-146)/(74-89) 132/83     Weight: 68.9 kg (152 lb)  Body mass index is 26.09 kg/m².      Intake/Output Summary (Last 24 hours) at 7/14/2020 1325  Last data filed at 7/14/2020 1148  Gross per 24 hour   Intake 1433.4 ml   Output 4950 ml   Net -3516.6 ml           Significant Labs:  Lab Results   Component Value Date    GROUPTRH B NEG 07/14/2020    HEPBSAG Negative 11/26/2019    STREPBCULT negative 06/30/2020    STREPBCULT positive 06/30/2020    STREPBCULT positive 06/30/2020     Recent Labs   Lab 07/14/20  0341   HGB 7.6*   HCT 25.0*       I have personallly reviewed all pertinent lab results from the last 24 hours.    Physical Exam:   Constitutional: She is oriented to person, place, and time. She appears well-developed and well-nourished.    HENT:   Head: Normocephalic and atraumatic.       Pulmonary/Chest: Effort normal.        Abdominal: Soft. Bowel sounds are normal. She exhibits no distension. Abdominal incision: mepilex dressing in place, dry. There is no abdominal tenderness (appropriate post op tenderness).     Genitourinary:    Uterus normal.      Genitourinary Comments: Uterus if firm below the umbilicus             Musculoskeletal:  Normal range of motion and moves all extremeties. No tenderness or edema.       Neurological: She is alert and oriented to person, place, and time.    Skin: Skin is warm and dry.    Psychiatric: She has a normal mood and affect. Her behavior is normal.

## 2020-07-15 VITALS
WEIGHT: 152 LBS | HEART RATE: 70 BPM | SYSTOLIC BLOOD PRESSURE: 124 MMHG | RESPIRATION RATE: 18 BRPM | BODY MASS INDEX: 25.95 KG/M2 | OXYGEN SATURATION: 98 % | DIASTOLIC BLOOD PRESSURE: 70 MMHG | HEIGHT: 64 IN | TEMPERATURE: 98 F

## 2020-07-15 PROCEDURE — 25000003 PHARM REV CODE 250: Performed by: OBSTETRICS & GYNECOLOGY

## 2020-07-15 PROCEDURE — 90715 TDAP VACCINE 7 YRS/> IM: CPT | Performed by: OBSTETRICS & GYNECOLOGY

## 2020-07-15 PROCEDURE — 90471 IMMUNIZATION ADMIN: CPT | Performed by: OBSTETRICS & GYNECOLOGY

## 2020-07-15 PROCEDURE — 63600175 PHARM REV CODE 636 W HCPCS: Performed by: OBSTETRICS & GYNECOLOGY

## 2020-07-15 RX ORDER — IBUPROFEN 600 MG/1
600 TABLET ORAL EVERY 6 HOURS
Qty: 30 TABLET | Refills: 0
Start: 2020-07-15

## 2020-07-15 RX ORDER — OXYCODONE HYDROCHLORIDE 10 MG/1
5 TABLET ORAL EVERY 4 HOURS PRN
Qty: 28 TABLET | Refills: 0 | Status: SHIPPED | OUTPATIENT
Start: 2020-07-15

## 2020-07-15 RX ADMIN — PRENATAL VIT W/ FE FUMARATE-FA TAB 27-0.8 MG 1 TABLET: 27-0.8 TAB at 08:07

## 2020-07-15 RX ADMIN — IBUPROFEN 600 MG: 400 TABLET, FILM COATED ORAL at 05:07

## 2020-07-15 RX ADMIN — OXYCODONE HYDROCHLORIDE 10 MG: 5 TABLET ORAL at 12:07

## 2020-07-15 RX ADMIN — IBUPROFEN 600 MG: 400 TABLET, FILM COATED ORAL at 12:07

## 2020-07-15 RX ADMIN — DOCUSATE SODIUM 200 MG: 100 CAPSULE, LIQUID FILLED ORAL at 08:07

## 2020-07-15 RX ADMIN — CLOSTRIDIUM TETANI TOXOID ANTIGEN (FORMALDEHYDE INACTIVATED), CORYNEBACTERIUM DIPHTHERIAE TOXOID ANTIGEN (FORMALDEHYDE INACTIVATED), BORDETELLA PERTUSSIS TOXOID ANTIGEN (GLUTARALDEHYDE INACTIVATED), BORDETELLA PERTUSSIS FILAMENTOUS HEMAGGLUTININ ANTIGEN (FORMALDEHYDE INACTIVATED), BORDETELLA PERTUSSIS PERTACTIN ANTIGEN, AND BORDETELLA PERTUSSIS FIMBRIAE 2/3 ANTIGEN 0.5 ML: 5; 2; 2.5; 5; 3; 5 INJECTION, SUSPENSION INTRAMUSCULAR at 12:07

## 2020-07-15 RX ADMIN — OXYCODONE HYDROCHLORIDE 10 MG: 5 TABLET ORAL at 08:07

## 2020-07-15 NOTE — LACTATION NOTE
"This note was copied from a baby's chart.  Infant in mother's arms. Stated, "he is really breastfeeding well." Multiple feedings documented up to 30 minutes in length at each breast. Denies any pain/discomfort. Discussed normal  feeding patterns, pumping, engorgement, and followup resources. V/u. Ensured postpartum/breastfeeding guide at bedside. Will f/u prn.   "

## 2020-07-15 NOTE — DISCHARGE INSTRUCTIONS
FOLLOW UP WITH YOUR DOCTOR IN 1 WEEK OR SOONER FOR ANY PROBLEMS.    Pelvic rest for 6 weeks (no sex, tampons, douching, nothing in the vagina)   You can experience vaginal bleeding on and off for up to 6 weeks, it will gradually get lighter and the color will change from bright red to a brownish discharge towards the end.     Activity:   NO strenuous activities or exercising for 6 weeks. Do not /lift anything over 15 pounds and no heavy housework or cleaning for 6 weeks. Limit stair climbing to twice a day during the first 2 weeks.   NO driving for 4 weeks. You may take short car trips but do not drive.   You may shower ONLY for the first 2 weeks, after 2 weeks you can soak in a bathtub. Use a mild soap, no heavy perfumes or fragrances to avoid irritation.   Walking frequently following a  delivery promotes healing and decreases pain associated with gas.   If constipation develops: You may take Colace (stool softener), Milk of Magnesia, Dulcolax or Miralax. All of these medications are sold over the counter.   Incision Care:   Clean your incision with mild soap & warm water only- do not scrub- let warm water run over it, then pat dry.     Pain Relief:   You may take Motrin for mild pain & uterine cramping.     Emotional Changes:   You may experience baby blues after delivery. You may feel let down, anxious and cry easily. This is normal. These feelings can begin 2-3 days after delivery and usually disappear in about a week or two. Prolonged sadness may indicate postpartum depression.       ***SEEK IMMEDIATE HELP FROM THE EMERGENCY ROOM IF YOU HAVE ANY CHEST PAIN OR DIFFICULTY BREATHING.    Call your doctor for any of the following:   Problems with any of your medications, inability to eat.   Foul smelling vaginal discharge.   If you notice pus-like drainage from your incision, if your incision or the area around it becomes hot or swollen, or if you notice a foul smelling odor.   Temperature above  100.4.   Heavy vaginal bleeding. All women bleed different after delivery and each delivery is different. Heavy bleeding consists of saturating a ernesto pad in a 1 hour time period. Passing clots are normal, if you pass a blood clot larger than the size of a golf ball call your doctor's office.   If you experience pain in your legs/calves, if one leg increases in size and becomes swollen or becomes hot to touch or discolored.   Crying or periods of sadness beyond 2 weeks.     If you are breast feeding:   Wash your breasts with mild soap and warm water.   You should wear a supportive bra.   You should continue to take a prenatal vitamin for 6 weeks or until breastfeeding is discontinued.   If nipples are sore, apply a few drops of breast milk after nursing and let air dry or you can use Lanolin cream.   If breasts are engorged, apply warmth and express milk.   [facility] lactation consultant is available at [phone] for your breastfeeding needs.    If you are not breastfeeding:   Wear a tight bra and do not stimulate your breasts. Avoid handling your breasts and do not express milk. You may apply ice packs or cabbage leaves to relieve discomfort from engorgement. If your breasts become warm to touch, reddened or lumps develop call your doctor.          Breastfeeding Discharge Instructions       [facility] Breastfeeding Support Services [phone]   AAP recommendation of exclusive breastfeeding for the first 6 months of life and continued breastfeeding with the introduction of supplemental foods beyond the first year of life.  Instructed on the recommendation to delay all bottle and pacifier use until after 4 weeks of age and breastfeeding is well established.  Discussed the benefits of exclusive breastfeeding for both mother and baby.  Discussed the risks of supplementation/pacifier use on the exclusivity of breastfeeding in the first 6 months. Feed the baby at the earliest sign of hunger or  comfort  o Hands to mouth, sucking motions  o Rooting or searching for something to suck on  o Dont wait for crying - it is a not a late sign of hunger; it is a sign of distress     The feedings may be 8-12 times per 24hrs and will not follow a schedule   Alternate the breast you start the feeding with, or start with the breast that feels the fullest   Switch breasts when the baby takes himself off the breast or falls asleep   Keep offering breasts until the baby looks full, no longer gives hunger signs, and stays asleep when placed on his back in the crib   If the baby is sleepy and wont wake for a feeding, put the baby skin-to-skin dressed in a diaper against the mothers bare chest   Sleep near your baby   The baby should be positioned and latched on to the breast correctly  o Chest-to-chest, chin in the breast  o Babys lips are flipped outward  o Babys mouth is stretched open wide like a shout  o Babys sucking should feel like tugging to the mother  - The baby should be drinking at the breast:  o You should hear swallowing or gulping throughout the feeding  o You should see milk on the babys lips when he comes off the breast  o Your breasts should be softer when the baby is finished feeding  o The baby should look relaxed at the end of feedings  o After the 4th day and your milk is in:  o The babys poop should turn bright yellow and be loose, watery, and seedy  o The baby should have at least 3-4 poops the size of the palm of your hand per day  o The baby should have at least 6-8 wet diapers per day  o The urine should be light yellow in color  You should drink when you are thirsty and eat a healthy diet when you are    hungry.     Take naps to get the rest you need.   Take medications and/or drink alcohol only with permission of your obstetrician    or the babys pediatrician.  You can also call the [facility],   ([phone]), Monday-Friday, 8am-5pm Central time, to get the most    up-to-date evidence-based information on the use of medications during   pregnancy and breastfeeding.      The baby should be examined by a pediatrician at 3-5 days of age; unless ordered sooner by the pediatrician.  Once your milk comes in, the baby should be back to birth weight no later than 10-14 days of age.    If youre having problems with breastfeeding or have any questions regarding breastfeeding- call [facility] Breastfeeding Support services [phone]

## 2020-07-15 NOTE — PLAN OF CARE
07/15/20 1410   Final Note   Assessment Type Final Discharge Note   Anticipated Discharge Disposition Home

## 2020-07-15 NOTE — DISCHARGE SUMMARY
[facility]  Obstetrics  Discharge Summary      Patient Name: Flaca Dejesus  MRN: [id number]  Admission Date: 2020  Hospital Length of Stay: 2 days  Discharge Date and Time:  07/15/2020 1:19 PM  Attending Physician: Sita Pierce MD   Discharging Provider: Shania Beckford MD   Primary Care Provider: Primary Doctor No    HPI: No notes on file        Procedure(s) (LRB):   SECTION, WITH TUBAL LIGATION (N/A)     Hospital Course:   No notes on file     Consults (From admission, onward)        Status Ordering Provider     Consult to Lactation  Use PRN     Provider:  (Not yet assigned)    Acknowledged SITA PIERCE          Final Active Diagnoses:    Diagnosis Date Noted POA    PRINCIPAL PROBLEM:  Previous  section [Z98.891] 2020 Not Applicable      Problems Resolved During this Admission:        Significant Diagnostic Studies: Labs:   CBC   Recent Labs   Lab 20  0341   WBC 10.89   HGB 7.6*   HCT 25.0*        Lab Results   Component Value Date    GROUPTRH B NEG 2020     Physical exam:  Patient doing well passing flatus requesting discharge home  Vital signs stable afebrile  Fundus firm nontender palpation  Dressing dry  No Homans sign    Assessment and plan:  Postoperative day 2 Caesarean section  Rh negative baby is Rh positive patient received RhoGAM  Percocet for pain      Feeding Method: breast    Immunizations     Date Immunization Status Dose Route/Site Given by    07/15/20 1248 Rho (D) Immune Globulin - IM Deferred 300 mcg Intramuscular/ April NAYELI Soto    07/15/20 1247 MMR Deferred 0.5 mL Subcutaneous/Left deltoid April NAYELI Soto    07/15/20 1237 Tdap Deleted 0.5 mL Intramuscular/Left deltoid     20 1601 Rho (D) Immune Globulin - IM Given 300 mcg Intramuscular/Right Ventrogluteal Tejal White RN    07/15/20 1235 Tdap Given 0.5 mL Intramuscular/Left deltoid April NAYELI Soto          Delivery:    Episiotomy:     Lacerations:     Repair suture:      Repair # of packets:     Blood loss (ml):       Birth information:  YOB: 2018   Time of birth: 7:45 AM   Sex: male   Delivery type: , Low Transverse   Gestational Age: 39w1d    Delivery Clinician:      Other providers:       Additional  information:  Forceps:    Vacuum:    Breech:    Observed anomalies      Living?:           APGARS  One minute Five minutes Ten minutes   Skin color:         Heart rate:         Grimace:         Muscle tone:         Breathing:         Totals: 9  9        Placenta: Delivered:       appearance    Pending Diagnostic Studies:     None          Discharged Condition: good    Disposition: Home or Self Care    Follow Up:  Follow-up Information     Sita Pierce MD In 1 week.    Specialty: Obstetrics  Why: For wound re-check  Contact information:  [address]  [address]  [facility]  [facility] [address]  [phone]                 Patient Instructions:      Diet Adult Regular     No driving until:   Order Comments: 14 days for vaginal delivery  28 days for  Section     Notify your health care provider if you experience any of the following:  temperature >100.4     Notify your health care provider if you experience any of the following:  persistent nausea and vomiting or diarrhea     Notify your health care provider if you experience any of the following:  severe uncontrolled pain     Notify your health care provider if you experience any of the following:  redness, tenderness, or signs of infection (pain, swelling, redness, odor or green/yellow discharge around incision site)     Notify your health care provider if you experience any of the following:  difficulty breathing or increased cough     Notify your health care provider if you experience any of the following:  severe persistent headache     No dressing needed     Leave dressing on - Keep it clean, dry, and intact until clinic visit     Activity as tolerated   Order  Comments: Pelvic rest x 6 weeks     Shower on day dressing removed (No bath)     Medications:  Current Discharge Medication List      START taking these medications    Details   ibuprofen (ADVIL,MOTRIN) 600 MG tablet Take 1 tablet (600 mg total) by mouth every 6 (six) hours.  Refills: 0      oxyCODONE (ROXICODONE) 10 mg Tab immediate release tablet Take 0.5 tablets (5 mg total) by mouth every 4 (four) hours as needed.  Qty: 28 tablet, Refills: 0    Comments: Quantity prescribed more than 7 day supply? No         CONTINUE these medications which have NOT CHANGED    Details   calcium carbonate (OS-HILLARY) 500 mg calcium (1,250 mg) chewable tablet Take 1 tablet by mouth once daily.      prenatal vit,hillary 74/iron/folic (PRENATAL VITAMIN 1+1 ORAL) Take by mouth.             Shania Beckford MD  Obstetrics  [facility]

## 2020-07-15 NOTE — PLAN OF CARE
V/S stable, no distress noted. Call light a bedside within reach. Low transverse incision with Mepilex  dressing. Patient took shower, tolerated well. Taking po pain meds. Bonding well with infant, breastfeeding well. Will continue to monitor.

## 2021-05-04 ENCOUNTER — PATIENT MESSAGE (OUTPATIENT)
Dept: RESEARCH | Facility: HOSPITAL | Age: 41
End: 2021-05-04

## (undated) DEVICE — AGENT HEMOSTATIC ARISTA 3GR

## (undated) DEVICE — HEMOSTAT SURGICEL FIBRLR 2X4IN

## (undated) DEVICE — DRESSING POST OP MEPILEX  AG  4X12